# Patient Record
Sex: FEMALE | Race: WHITE | NOT HISPANIC OR LATINO | Employment: OTHER | URBAN - METROPOLITAN AREA
[De-identification: names, ages, dates, MRNs, and addresses within clinical notes are randomized per-mention and may not be internally consistent; named-entity substitution may affect disease eponyms.]

---

## 2019-03-06 ENCOUNTER — OFFICE VISIT (OUTPATIENT)
Dept: FAMILY MEDICINE | Facility: CLINIC | Age: 74
End: 2019-03-06
Payer: MEDICARE

## 2019-03-06 VITALS
WEIGHT: 165.63 LBS | RESPIRATION RATE: 18 BRPM | HEIGHT: 67 IN | HEART RATE: 66 BPM | BODY MASS INDEX: 26 KG/M2 | OXYGEN SATURATION: 95 % | SYSTOLIC BLOOD PRESSURE: 138 MMHG | DIASTOLIC BLOOD PRESSURE: 89 MMHG

## 2019-03-06 DIAGNOSIS — L40.9 PSORIASIS: ICD-10-CM

## 2019-03-06 DIAGNOSIS — E78.49 OTHER HYPERLIPIDEMIA: ICD-10-CM

## 2019-03-06 DIAGNOSIS — E03.8 OTHER SPECIFIED HYPOTHYROIDISM: Primary | ICD-10-CM

## 2019-03-06 DIAGNOSIS — F32.89 OTHER DEPRESSION: ICD-10-CM

## 2019-03-06 DIAGNOSIS — J45.909 MODERATE ASTHMA, UNSPECIFIED WHETHER COMPLICATED, UNSPECIFIED WHETHER PERSISTENT: ICD-10-CM

## 2019-03-06 PROBLEM — F32.A DEPRESSION: Status: ACTIVE | Noted: 2019-03-06

## 2019-03-06 PROCEDURE — 1101F PR PT FALLS ASSESS DOC 0-1 FALLS W/OUT INJ PAST YR: ICD-10-PCS | Mod: CPTII,S$GLB,, | Performed by: FAMILY MEDICINE

## 2019-03-06 PROCEDURE — 99999 PR PBB SHADOW E&M-NEW PATIENT-LVL III: ICD-10-PCS | Mod: PBBFAC,,, | Performed by: FAMILY MEDICINE

## 2019-03-06 PROCEDURE — 1101F PT FALLS ASSESS-DOCD LE1/YR: CPT | Mod: CPTII,S$GLB,, | Performed by: FAMILY MEDICINE

## 2019-03-06 PROCEDURE — 99204 PR OFFICE/OUTPT VISIT, NEW, LEVL IV, 45-59 MIN: ICD-10-PCS | Mod: S$GLB,,, | Performed by: FAMILY MEDICINE

## 2019-03-06 PROCEDURE — 99999 PR PBB SHADOW E&M-NEW PATIENT-LVL III: CPT | Mod: PBBFAC,,, | Performed by: FAMILY MEDICINE

## 2019-03-06 PROCEDURE — 99204 OFFICE O/P NEW MOD 45 MIN: CPT | Mod: S$GLB,,, | Performed by: FAMILY MEDICINE

## 2019-03-06 RX ORDER — LEVOTHYROXINE SODIUM 150 UG/1
150 TABLET ORAL DAILY
Refills: 3 | COMMUNITY
Start: 2018-12-06 | End: 2019-05-23 | Stop reason: SDUPTHER

## 2019-03-06 RX ORDER — FAMOTIDINE 20 MG/1
20 TABLET, FILM COATED ORAL NIGHTLY
Refills: 0 | COMMUNITY
Start: 2019-02-06 | End: 2019-03-06

## 2019-03-06 RX ORDER — CHOLECALCIFEROL (VITAMIN D3) 125 MCG
5000 CAPSULE ORAL
COMMUNITY
Start: 2018-01-09

## 2019-03-06 RX ORDER — FLUTICASONE PROPIONATE AND SALMETEROL 50; 250 UG/1; UG/1
1 POWDER RESPIRATORY (INHALATION) 2 TIMES DAILY
Refills: 3 | COMMUNITY
Start: 2019-02-06

## 2019-03-06 RX ORDER — VENLAFAXINE HYDROCHLORIDE 75 MG/1
75 CAPSULE, EXTENDED RELEASE ORAL 2 TIMES DAILY
Qty: 180 CAPSULE | Refills: 3 | Status: SHIPPED | OUTPATIENT
Start: 2019-03-06 | End: 2020-01-06 | Stop reason: SDUPTHER

## 2019-03-06 RX ORDER — VENLAFAXINE HYDROCHLORIDE 75 MG/1
75 CAPSULE, EXTENDED RELEASE ORAL 2 TIMES DAILY
COMMUNITY
Start: 2017-11-09 | End: 2019-03-06 | Stop reason: SDUPTHER

## 2019-03-06 RX ORDER — ATORVASTATIN CALCIUM 40 MG/1
40 TABLET, FILM COATED ORAL
COMMUNITY
End: 2019-04-12

## 2019-03-06 RX ORDER — OMEGA-3-ACID ETHYL ESTERS 1 G/1
2 CAPSULE, LIQUID FILLED ORAL
COMMUNITY
Start: 2018-01-09 | End: 2019-03-06

## 2019-03-06 RX ORDER — HYDROXYZINE PAMOATE 25 MG/1
CAPSULE ORAL
COMMUNITY
Start: 2013-02-19 | End: 2019-04-12

## 2019-03-06 RX ORDER — MONTELUKAST SODIUM 10 MG/1
10 TABLET ORAL DAILY
Refills: 1 | COMMUNITY
Start: 2019-02-06 | End: 2019-03-06

## 2019-03-06 RX ORDER — TRIAMCINOLONE ACETONIDE 1 MG/G
OINTMENT TOPICAL 2 TIMES DAILY
Qty: 453.6 G | Refills: 2 | Status: SHIPPED | OUTPATIENT
Start: 2019-03-06 | End: 2019-07-15

## 2019-03-06 RX ORDER — IPRATROPIUM BROMIDE AND ALBUTEROL SULFATE 2.5; .5 MG/3ML; MG/3ML
SOLUTION RESPIRATORY (INHALATION)
COMMUNITY
Start: 2016-10-17

## 2019-03-06 RX ORDER — DOXYCYCLINE 100 MG/1
CAPSULE ORAL
Refills: 0 | COMMUNITY
Start: 2019-02-06 | End: 2019-03-06

## 2019-03-06 RX ORDER — ALBUTEROL SULFATE 90 UG/1
AEROSOL, METERED RESPIRATORY (INHALATION)
Refills: 5 | COMMUNITY
Start: 2018-12-13 | End: 2019-10-02

## 2019-03-06 NOTE — PROGRESS NOTES
Subjective:       Patient ID: Juana Vega is a 73 y.o. female.    Chief Complaint: Establish Care; Annual Exam; Rash; and Medication Refill    New patient to establish    In regards to the patient's dyslipidemia, patient reports has been compliant with the medication regimen  without side effects.    In regards to their hypothyroidism, patient denies poor energy, skin and hair changes, as well as weight gain. Last TSH normal/symmetric, will Maintain as per orders.       Patient reports that depression is controlled without si/hi.    Skin rash  Extensor surfaces  Has not tried anything yet  Scaly      Review of Systems   Constitutional: Negative for activity change, appetite change, chills, fatigue and fever.   HENT: Negative for congestion, dental problem, facial swelling, nosebleeds, postnasal drip, sinus pain, sore throat, trouble swallowing and voice change.    Eyes: Negative for pain, discharge and visual disturbance.   Respiratory: Negative for apnea, cough, chest tightness and shortness of breath.    Cardiovascular: Negative for chest pain and palpitations.   Gastrointestinal: Negative for abdominal pain, blood in stool, constipation and nausea.   Endocrine: Negative for cold intolerance, polydipsia and polyuria.   Genitourinary: Negative for difficulty urinating, enuresis and flank pain.   Musculoskeletal: Negative for arthralgias and back pain.   Skin: Positive for rash. Negative for color change.   Allergic/Immunologic: Negative for environmental allergies and immunocompromised state.   Neurological: Negative for dizziness and light-headedness.   Hematological: Negative for adenopathy.   Psychiatric/Behavioral: Negative for agitation, behavioral problems, decreased concentration and dysphoric mood. The patient is not nervous/anxious.    All other systems reviewed and are negative.        Reviewed family, medical, surgical, and social history.    Objective:      /89 (BP Location: Left arm, Patient  "Position: Sitting, BP Method: Medium (Automatic))   Pulse 66   Resp 18   Ht 5' 7" (1.702 m)   Wt 75.1 kg (165 lb 9.6 oz)   SpO2 95%   BMI 25.94 kg/m²   Physical Exam   Constitutional: She is oriented to person, place, and time. She appears well-developed and well-nourished. No distress.   HENT:   Head: Normocephalic and atraumatic.   Nose: Nose normal.   Mouth/Throat: Oropharynx is clear and moist. No oropharyngeal exudate.   Eyes: Conjunctivae and EOM are normal. Pupils are equal, round, and reactive to light. No scleral icterus.   Neck: Normal range of motion. Neck supple. No thyromegaly present.   Cardiovascular: Normal rate, regular rhythm and normal heart sounds. Exam reveals no gallop and no friction rub.   No murmur heard.  Pulmonary/Chest: Effort normal and breath sounds normal. No respiratory distress. She has no wheezes. She has no rales. She exhibits no tenderness.   Abdominal: Soft. Bowel sounds are normal. She exhibits no distension. There is no tenderness. There is no guarding.   Musculoskeletal: Normal range of motion. She exhibits no edema, tenderness or deformity.   Lymphadenopathy:     She has no cervical adenopathy.   Neurological: She is alert and oriented to person, place, and time. She displays normal reflexes. No cranial nerve deficit or sensory deficit. She exhibits normal muscle tone.   Skin: Skin is warm and dry. Rash noted. She is not diaphoretic. There is erythema. No pallor.   Psychiatric: She has a normal mood and affect. Her behavior is normal. Judgment and thought content normal.   Nursing note and vitals reviewed.      Assessment:       1. Other specified hypothyroidism    2. Other depression    3. Other hyperlipidemia    4. Moderate asthma, unspecified whether complicated, unspecified whether persistent    5. Psoriasis        Plan:       Other specified hypothyroidism    Other depression  -     venlafaxine (EFFEXOR-XR) 75 MG 24 hr capsule; Take 1 capsule (75 mg total) by mouth " 2 (two) times daily.  Dispense: 180 capsule; Refill: 3    Other hyperlipidemia    Moderate asthma, unspecified whether complicated, unspecified whether persistent    Psoriasis  -     triamcinolone acetonide 0.1% (KENALOG) 0.1 % ointment; Apply topically 2 (two) times daily.  Dispense: 453.6 g; Refill: 2            Risks, benefits, and side effects were discussed with the patient. All questions were answered to the fullest satisfaction of the patient, and pt verbalized understanding and agreement to treatment plan. Pt was to call with any new or worsening symptoms, or present to the ER.

## 2019-04-02 ENCOUNTER — OFFICE VISIT (OUTPATIENT)
Dept: PODIATRY | Facility: CLINIC | Age: 74
End: 2019-04-02
Payer: MEDICARE

## 2019-04-02 VITALS
HEART RATE: 68 BPM | RESPIRATION RATE: 18 BRPM | WEIGHT: 165 LBS | SYSTOLIC BLOOD PRESSURE: 129 MMHG | BODY MASS INDEX: 25.9 KG/M2 | DIASTOLIC BLOOD PRESSURE: 72 MMHG | OXYGEN SATURATION: 99 % | HEIGHT: 67 IN | TEMPERATURE: 98 F

## 2019-04-02 DIAGNOSIS — M79.674 PAIN IN TOE OF RIGHT FOOT: ICD-10-CM

## 2019-04-02 DIAGNOSIS — L84 FOOT CALLUS: ICD-10-CM

## 2019-04-02 DIAGNOSIS — M20.61 ACQUIRED DEFORMITY OF RIGHT TOE: Primary | ICD-10-CM

## 2019-04-02 PROCEDURE — 99202 OFFICE O/P NEW SF 15 MIN: CPT | Mod: S$GLB,,, | Performed by: PODIATRIST

## 2019-04-02 PROCEDURE — 1101F PR PT FALLS ASSESS DOC 0-1 FALLS W/OUT INJ PAST YR: ICD-10-PCS | Mod: CPTII,S$GLB,, | Performed by: PODIATRIST

## 2019-04-02 PROCEDURE — 99999 PR PBB SHADOW E&M-EST. PATIENT-LVL III: ICD-10-PCS | Mod: PBBFAC,,, | Performed by: PODIATRIST

## 2019-04-02 PROCEDURE — 1101F PT FALLS ASSESS-DOCD LE1/YR: CPT | Mod: CPTII,S$GLB,, | Performed by: PODIATRIST

## 2019-04-02 PROCEDURE — 99999 PR PBB SHADOW E&M-EST. PATIENT-LVL III: CPT | Mod: PBBFAC,,, | Performed by: PODIATRIST

## 2019-04-02 PROCEDURE — 99202 PR OFFICE/OUTPT VISIT, NEW, LEVL II, 15-29 MIN: ICD-10-PCS | Mod: S$GLB,,, | Performed by: PODIATRIST

## 2019-04-06 NOTE — PROGRESS NOTES
Subjective:      Patient ID: Juana Vega is a 74 y.o. female.    Chief Complaint: Callouses   patient presents with complaint of painful area on the right foot which has been present for about 4 months. Has pain in toes, not sure if it's the second or third.  Reports this area is new, no change in shoes or activity, seemed to have progressed quickly, having difficulty walking, pain even in comfortable shoes.  Past medical history includes asthma, hyperlipidemia, hypothyroidism, depression  Medications include Advair, albuterol, Lipitor, Synthroid, Effexor, Ventolin inhaler  Sees Dr. Endy RENDON     Constitutional    Pleasant, well-nourished, no distress, well oriented    Eyes         GLASSES    Cardiovascular          No chest pain, no shortness of breath    Respiratory           No cough, no congestion     Musculoskeletal          YES muscle aches, YES arthralgias/joint pain, no swelling in the extremities    Neurologic         No weakness, no numbness          Objective:      Physical Exam  Vascular         Arterial Pulses Right: posterior tibialis 2/4, dorsalis pedis 2/4, normal CFT   Arterial Pulses Left: posterior tibialis 2/4, dorsalis pedis 2/4, normal CFT   No lower extremity edema bilateral   Pedal skin temperature and color are normal bilateral     Integumentary   Contracted, arthritic PIPJ 2nd digit right foot has developed painful, well-defined hyperkeratotic lesion on the medial aspect.  Upon debridement no discoloration or skin opening, area is mildly edematous, no calor.  This digit is slightly deviated medially and overlapping the lateral aspect of the hallux which is caused a dystrophic.               Painful hyperkeratotic lesion sub 2nd MPJ right foot due to contracted digit and plantar flexed metatarsal head.  No discoloration upon debridement    Neurological   Gross sensation intact, denies burning or tingling in feet    Musculoskeletal   Muscle Strength/Testing and Tone:  Intact, normal  tone bilateral   Joints, Bones, and Muscles:   Contracted, medially deviated 2nd digit right foot.  Same deformity present on the left but not as severe, arthritic and degenerative changes, limited range of motion consistent with age bilateral feet        Walks well unassisted          Assessment:       Encounter Diagnoses   Name Primary?    Acquired deformity of right toe Yes    Pain in toe of right foot     Foot callus          Plan:       Juana was seen today for callouses.    Diagnoses and all orders for this visit:    Acquired deformity of right toe    Pain in toe of right foot    Foot callus      Reviewed deformity 2nd digit, arthritis which contributes to this condition and overlapping part of the right great toe.  Pointed out to patient this is present of the left foot as well but not as severe.  After callus tissue was superficially debrided in this area explained patient she needs to utilize soft padding between the toes to prevent pressure.  Dispensed samples still a post toe sock, recommended baby powder to absorb moisture and reduce friction, reviewed appropriate shoes to accommodate this area.  Explained to patient she is risk for developing a sore in this region and needs to monitor it daily.   Reviewed better care maintenance of this area as well as the bottom of the right foot, this callus lesion was addressed as well and we reviewed arch support to help take pressure off this area.  Discussed appropriate arch support, how to gradually adjust to wearing comfortably, appropriate shoes indoors to prevent recurrence.  Reviewed ice /cool therapy to try to reduce inflammation regarding the toe to help with her pain.  Recommended over-the-counter Aspercreme, apply as directed for pain 2nd digit, however needs to keep the area dry.  Reviewed conservative treatments and better maintenance of calluses and nail to try to prevent recurrence.   Patient was in understanding and agreement with treatment  plan.  I counseled the patient on her conditions, their implications and medical management.  Instructed patient to contact the office with any changes, questions, concerns, worsening of symptoms. Patient verbalized understanding.   Total face to face time, exam, assessment, treatment, discussion, documentation 20 minutes, more than half this time spent on consultation and coordination of care.   Follow up as needed      This note was created using M*Modal voice recognition software that occasionally misinterpreted phrases or words.

## 2019-04-10 ENCOUNTER — TELEPHONE (OUTPATIENT)
Dept: FAMILY MEDICINE | Facility: CLINIC | Age: 74
End: 2019-04-10

## 2019-04-10 NOTE — TELEPHONE ENCOUNTER
----- Message from Gracie Osorio sent at 4/10/2019  8:42 AM CDT -----  Contact: Shantanu daughter  Type:  Sooner Apoointment Request    Caller is requesting a sooner appointment.  Caller declined first available appointment listed below.  Caller will not accept being placed on the waitlist and is requesting a message be sent to doctor.    Name of Caller:  Shantanu  When is the first available appointment?  05/29/19  Symptoms:  F/u from hospital visit  Best Call Back Number:  [phone]  Additional Information:  Pls call Shantanu regarding her mother being seen this week for f/u from hospital visit Saturday.

## 2019-04-10 NOTE — TELEPHONE ENCOUNTER
"I have spoken with patient's daughter, Fátima. Pt was seen at [facility] in [place], daughter stated that patient could "barely breathe".   Appt made with Karla for Friday morning.   Cristina  "

## 2019-04-12 ENCOUNTER — OFFICE VISIT (OUTPATIENT)
Dept: INTERNAL MEDICINE | Facility: CLINIC | Age: 74
End: 2019-04-12
Payer: MEDICARE

## 2019-04-12 VITALS
BODY MASS INDEX: 26.06 KG/M2 | OXYGEN SATURATION: 95 % | SYSTOLIC BLOOD PRESSURE: 116 MMHG | HEIGHT: 67 IN | WEIGHT: 166 LBS | HEART RATE: 77 BPM | TEMPERATURE: 97 F | DIASTOLIC BLOOD PRESSURE: 66 MMHG

## 2019-04-12 DIAGNOSIS — J45.909 MODERATE ASTHMA WITHOUT COMPLICATION, UNSPECIFIED WHETHER PERSISTENT: Primary | ICD-10-CM

## 2019-04-12 PROCEDURE — 1101F PR PT FALLS ASSESS DOC 0-1 FALLS W/OUT INJ PAST YR: ICD-10-PCS | Mod: CPTII,S$GLB,, | Performed by: NURSE PRACTITIONER

## 2019-04-12 PROCEDURE — 99999 PR PBB SHADOW E&M-EST. PATIENT-LVL III: ICD-10-PCS | Mod: PBBFAC,,, | Performed by: NURSE PRACTITIONER

## 2019-04-12 PROCEDURE — 1101F PT FALLS ASSESS-DOCD LE1/YR: CPT | Mod: CPTII,S$GLB,, | Performed by: NURSE PRACTITIONER

## 2019-04-12 PROCEDURE — 99999 PR PBB SHADOW E&M-EST. PATIENT-LVL III: CPT | Mod: PBBFAC,,, | Performed by: NURSE PRACTITIONER

## 2019-04-12 PROCEDURE — 99213 OFFICE O/P EST LOW 20 MIN: CPT | Mod: S$GLB,,, | Performed by: NURSE PRACTITIONER

## 2019-04-12 PROCEDURE — 99213 PR OFFICE/OUTPT VISIT, EST, LEVL III, 20-29 MIN: ICD-10-PCS | Mod: S$GLB,,, | Performed by: NURSE PRACTITIONER

## 2019-04-12 RX ORDER — MONTELUKAST SODIUM 10 MG/1
10 TABLET ORAL NIGHTLY
COMMUNITY

## 2019-04-12 RX ORDER — AMOXICILLIN 500 MG
CAPSULE ORAL DAILY
COMMUNITY
End: 2019-07-15

## 2019-04-12 RX ORDER — NAPROXEN SODIUM 220 MG
220 TABLET ORAL 2 TIMES DAILY WITH MEALS
COMMUNITY

## 2019-04-12 RX ORDER — PRAVASTATIN SODIUM 20 MG/1
20 TABLET ORAL DAILY
COMMUNITY
End: 2019-05-29 | Stop reason: SDUPTHER

## 2019-04-12 NOTE — PROGRESS NOTES
Subjective:       Patient ID: Juana Vega is a 74 y.o. female.    Chief Complaint: Chest Congestion and Cough    Juana Vega is a 74 year old female who presents to the clinic today for ER follow up. She reports she was seen a week ago at [facility] for Upper respiratory infection and bronchitis. She reports overall she feels much better. She states she has completed her antibiotics and given a steroid shot at the hospital. She has a hx of asthma. She denies CP, SOB, N/v/d. She is requesting prescription for nebulizer and supplies.     Review of Systems   Constitutional: Negative for appetite change, chills, diaphoresis, fatigue, fever and unexpected weight change.   HENT: Positive for postnasal drip. Negative for congestion, ear pain, facial swelling, hearing loss, rhinorrhea, sinus pressure, sinus pain, sneezing, sore throat, tinnitus and trouble swallowing.    Eyes: Negative for pain, redness, itching and visual disturbance.   Respiratory: Negative for cough, chest tightness, shortness of breath and wheezing.          ASTHMA   Cardiovascular: Negative for chest pain, palpitations and leg swelling.   Gastrointestinal: Negative for abdominal distention, abdominal pain, blood in stool, constipation, diarrhea, nausea and vomiting.   Endocrine: Negative for polydipsia, polyphagia and polyuria.   Genitourinary: Negative for difficulty urinating, dysuria, frequency and urgency.   Musculoskeletal: Negative for arthralgias, gait problem and myalgias.   Skin: Negative for color change.   Neurological: Negative for dizziness, syncope, speech difficulty, weakness, light-headedness and headaches.   Hematological: Negative for adenopathy.   Psychiatric/Behavioral: Negative for behavioral problems, confusion, decreased concentration, hallucinations, sleep disturbance and suicidal ideas. The patient is not nervous/anxious.          Reviewed family, medical, surgical, and social history.    Objective:      /66   " Pulse 77   Temp 96.9 °F (36.1 °C) (Oral)   Ht 5' 7" (1.702 m)   Wt 75.3 kg (166 lb)   SpO2 95%   BMI 26.00 kg/m²   Physical Exam   Constitutional: She is oriented to person, place, and time. She appears well-developed and well-nourished. She is active and cooperative. No distress.   HENT:   Head: Normocephalic and atraumatic.   Right Ear: Hearing, tympanic membrane, external ear and ear canal normal. No drainage. No decreased hearing is noted.   Left Ear: Hearing, tympanic membrane, external ear and ear canal normal. No drainage. No decreased hearing is noted.   Nose: Nose normal. No mucosal edema, rhinorrhea or nasal deformity. No epistaxis.   Mouth/Throat: Uvula is midline, oropharynx is clear and moist and mucous membranes are normal. No uvula swelling. No oropharyngeal exudate or posterior oropharyngeal erythema. No tonsillar exudate.       Eyes: Pupils are equal, round, and reactive to light. Conjunctivae, EOM and lids are normal. Right eye exhibits no discharge. Left eye exhibits no discharge.   Neck: Trachea normal and full passive range of motion without pain. No JVD present. No tracheal tenderness and no muscular tenderness present. Carotid bruit is not present. No edema and no erythema present. No thyroid mass and no thyromegaly present.   Cardiovascular: Normal rate, regular rhythm, S1 normal, S2 normal, normal heart sounds, intact distal pulses and normal pulses.   Pulmonary/Chest: Effort normal and breath sounds normal. No stridor. No tachypnea and no bradypnea. No respiratory distress. She has no decreased breath sounds. She has no wheezes. She has no rhonchi. She has no rales.   Abdominal: Soft. Normal appearance and bowel sounds are normal. She exhibits no distension and no abdominal bruit. There is no tenderness. There is no guarding and no CVA tenderness.   Musculoskeletal: She exhibits no edema, tenderness or deformity.        Right foot: There is normal range of motion.        Left foot: " There is normal range of motion.   Lymphadenopathy:     She has no cervical adenopathy.   Neurological: She is alert and oriented to person, place, and time. She has normal strength. She exhibits normal muscle tone.   Skin: Skin is warm, dry and intact. Capillary refill takes less than 2 seconds. No abrasion, no laceration, no lesion and no rash noted. She is not diaphoretic. No cyanosis. Nails show no clubbing.   Psychiatric: She has a normal mood and affect. Her speech is normal and behavior is normal. Judgment and thought content normal. Cognition and memory are normal.       Assessment:       1. Moderate asthma without complication, unspecified whether persistent        Plan:       Moderate asthma without complication, unspecified whether persistent  -     NEBULIZER FOR HOME USE  -     Cancel: NEBULIZER KIT (SUPPLIES) FOR HOME USE  -     NEBULIZER FOR HOME USE  -     NEBULIZER KIT (SUPPLIES) FOR HOME USE        PLAN:  - Discussed with patient   - Medications reviewed. Medication side effects discussed. Patient has no questions or concerns at this time. Informed patient to notify me regarding any concerns.  - encouraged flonase daily   - Nebulizer and supplies RX printed  - Informed patient to please notify me with any questions or concerns at anytime  - No labs UTD in [facility]. Will obtain labs from [facility]. Release of information paperwork given,   - Follow up ordered as scheduled with Dr Schumacher and SHARON          Risks, benefits, and side effects were discussed with the patient. All questions were answered to the fullest satisfaction of the patient, and pt verbalized understanding and agreement to treatment plan. Pt was to call with any new or worsening symptoms, or present to the ER.

## 2019-04-22 ENCOUNTER — TELEPHONE (OUTPATIENT)
Dept: FAMILY MEDICINE | Facility: CLINIC | Age: 74
End: 2019-04-22

## 2019-04-22 NOTE — TELEPHONE ENCOUNTER
----- Message from Massiel Alford sent at 4/22/2019 11:18 AM CDT -----  Contact: [phone]  Type:  Sooner Apoointment Request    Caller is requesting a sooner appointment.  Caller declined first available appointment listed below.  Caller will not accept being placed on the waitlist and is requesting a message be sent to doctor.    Name of Caller: patient daughter rachell   When is the first available appointment?  5/27/19  Symptoms:  f/u same issues w/ toes prefer to see dr jose Ervin Call Back Number:  [phone]

## 2019-04-26 ENCOUNTER — TELEPHONE (OUTPATIENT)
Dept: FAMILY MEDICINE | Facility: CLINIC | Age: 74
End: 2019-04-26

## 2019-04-26 NOTE — TELEPHONE ENCOUNTER
----- Message from Lamin Muhammad sent at 4/26/2019  8:57 AM CDT -----  Type: Needs Medical Advice    Who Called:  Audra Ervin Call Back Number: [phone]  Additional Information: Need copy of office notes for nebulizer orders - question about address

## 2019-05-02 ENCOUNTER — OFFICE VISIT (OUTPATIENT)
Dept: PODIATRY | Facility: CLINIC | Age: 74
End: 2019-05-02
Payer: MEDICARE

## 2019-05-02 VITALS
HEART RATE: 64 BPM | HEIGHT: 67 IN | WEIGHT: 166 LBS | RESPIRATION RATE: 18 BRPM | BODY MASS INDEX: 26.06 KG/M2 | OXYGEN SATURATION: 99 % | TEMPERATURE: 97 F | SYSTOLIC BLOOD PRESSURE: 130 MMHG | DIASTOLIC BLOOD PRESSURE: 73 MMHG

## 2019-05-02 DIAGNOSIS — L84 FOOT CALLUS: ICD-10-CM

## 2019-05-02 DIAGNOSIS — M77.8 CAPSULITIS OF FOOT, RIGHT: ICD-10-CM

## 2019-05-02 DIAGNOSIS — Z12.11 COLON CANCER SCREENING: ICD-10-CM

## 2019-05-02 DIAGNOSIS — M79.674 PAIN IN TOE OF RIGHT FOOT: ICD-10-CM

## 2019-05-02 DIAGNOSIS — M20.61 ACQUIRED DEFORMITY OF RIGHT TOE: Primary | ICD-10-CM

## 2019-05-02 DIAGNOSIS — D36.10 NEUROMA: ICD-10-CM

## 2019-05-02 PROCEDURE — 99999 PR PBB SHADOW E&M-EST. PATIENT-LVL III: ICD-10-PCS | Mod: PBBFAC,,, | Performed by: PODIATRIST

## 2019-05-02 PROCEDURE — 20600 DRAIN/INJ JOINT/BURSA W/O US: CPT | Mod: RT,S$GLB,, | Performed by: PODIATRIST

## 2019-05-02 PROCEDURE — 99214 OFFICE O/P EST MOD 30 MIN: CPT | Mod: 25,S$GLB,, | Performed by: PODIATRIST

## 2019-05-02 PROCEDURE — 20600 PR DRAIN/INJECT SMALL JOINT/BURSA: ICD-10-PCS | Mod: RT,S$GLB,, | Performed by: PODIATRIST

## 2019-05-02 PROCEDURE — 99999 PR PBB SHADOW E&M-EST. PATIENT-LVL III: CPT | Mod: PBBFAC,,, | Performed by: PODIATRIST

## 2019-05-02 PROCEDURE — 1101F PR PT FALLS ASSESS DOC 0-1 FALLS W/OUT INJ PAST YR: ICD-10-PCS | Mod: CPTII,S$GLB,, | Performed by: PODIATRIST

## 2019-05-02 PROCEDURE — 1101F PT FALLS ASSESS-DOCD LE1/YR: CPT | Mod: CPTII,S$GLB,, | Performed by: PODIATRIST

## 2019-05-02 PROCEDURE — 99214 PR OFFICE/OUTPT VISIT, EST, LEVL IV, 30-39 MIN: ICD-10-PCS | Mod: 25,S$GLB,, | Performed by: PODIATRIST

## 2019-05-02 RX ADMIN — LIDOCAINE HYDROCHLORIDE 1 ML: 10 INJECTION INFILTRATION; PERINEURAL at 11:05

## 2019-05-02 RX ADMIN — METHYLPREDNISOLONE ACETATE 40 MG: 80 INJECTION, SUSPENSION INTRA-ARTICULAR; INTRALESIONAL; INTRAMUSCULAR; SOFT TISSUE at 11:05

## 2019-05-02 NOTE — LETTER
May 5, 2019      Cody Schumacher MD  [address]  [place] [address]           [facility]  [address] [place] [address]  Phone: [phone]  Fax: [phone]          Patient: Juana Vega   MR Number: [id number]   YOB: 1945   Date of Visit: 5/2/2019       Dear Dr. Cody Schumacher:    Thank you for referring Juana Vega to me for evaluation. Attached you will find relevant portions of my assessment and plan of care.    If you have questions, please do not hesitate to call me. I look forward to following Juana Vega along with you.    Sincerely,    Malou Barclay, DPM    Enclosure  CC:  No Recipients    If you would like to receive this communication electronically, please contact [email or web address] or [phone] to request more information on [employer] access.    For providers and/or their staff who would like to refer a patient to [facility], please contact us through our one-stop-shop provider referral line, [facility], at [phone].    If you feel you have received this communication in error or would no longer like to receive these types of communications, please e-mail [email or web address]

## 2019-05-03 DIAGNOSIS — Z11.59 NEED FOR HEPATITIS C SCREENING TEST: ICD-10-CM

## 2019-05-03 DIAGNOSIS — Z12.39 BREAST CANCER SCREENING: ICD-10-CM

## 2019-05-05 RX ORDER — METHYLPREDNISOLONE ACETATE 80 MG/ML
80 INJECTION, SUSPENSION INTRA-ARTICULAR; INTRALESIONAL; INTRAMUSCULAR; SOFT TISSUE ONCE
Status: COMPLETED | OUTPATIENT
Start: 2019-05-06 | End: 2019-05-02

## 2019-05-05 RX ORDER — LIDOCAINE HYDROCHLORIDE 10 MG/ML
2 INJECTION INFILTRATION; PERINEURAL
Status: COMPLETED | OUTPATIENT
Start: 2019-05-05 | End: 2019-05-02

## 2019-05-06 NOTE — PROGRESS NOTES
Subjective:      Patient ID: Juana Vega is a 74 y.o. female.    Chief Complaint: Follow-up  Patient presents with her daughter with continued complaint of pain 2nd digit right foot.  She relates toe spacer was uncomfortable, possibly even causing additional pain, having pain 1st steps out of bed in the morning soon as she flexes her foot.  She states pain stays localized to the 2nd digit, does not radiate to the ball of the foot, denies burning or tingling.  Patient reports pain escalates with activity and it is keeping her from her certain activities and any length of walking.    ROS     Constitutional    Pleasant, well-nourished, no distress, well oriented    Eyes         GLASSES    Cardiovascular          No chest pain, no shortness of breath    Respiratory           No cough, no congestion     Musculoskeletal          YES muscle aches, YES arthralgias/joint pain, no swelling in the extremities          Objective:      Physical Exam  Vascular         Arterial Pulses Right: posterior tibialis 2/4, dorsalis pedis 2/4, normal CFT   No lower extremity edema  Pedal skin temperature and color are normal    Integumentary   Mild hyperkeratotic lesion plantar- medial 2nd digit right foot over PIPJ is well maintained,  no discoloration upon debridement, no surrounding erythema or edema.  Area is tender upon palpation   Area of hyperkeratotic tissue is has a small bulge, digit is elevated, medially contracted, he is arthritis with limited range of motion at the PIPJ    Neurological   Gross sensation intact, discomfort palpating plantar digital common plantar digital nerve 2nd webspace right foot    Musculoskeletal   Muscle Strength/Testing and Tone:  Intact, normal tone bilateral   Joints, Bones, and Muscles:   Contracted, medially deviated 2nd digit right foot,  this is flexible, but difficult to keep in position utilizing Coban as a splint, the extensor tendon is very tight.  Patient reports no pain upon range of  motion of the 2nd MPJ,  no pain upon manipulating the digit into proper position        Walks well unassisted          Assessment:       Encounter Diagnoses   Name Primary?    Acquired deformity of right toe Yes    Pain in toe of right foot     Foot callus     Capsulitis of foot, right     Neuroma - Right Foot          Plan:       Juana was seen today for follow-up.    Diagnoses and all orders for this visit:    Acquired deformity of right toe    Pain in toe of right foot    Foot callus    Capsulitis of foot, right    Neuroma - Right Foot    Other orders  -     lidocaine HCL 10 mg/ml (1%) injection 2 mL  -     methylPREDNISolone acetate injection 80 mg      Again reviewed deformity 2nd digit, arthritis, capsulitis, constant rubbing, development of callus.  Discussed flexibility acquired deformity 2nd digit.  Showed patient and daughter 2nd toe can be placed in proper position with some splinting, however the tendon leading to the toe is very tight and immediately draws it back.  Explained this add additional pressure to the 2nd metatarsal head and pointed out discomfort in the 2nd webspace.  Advised patient this is more likely the region of her discomforts since pain occurs with flexing of her foot.  We discussed cortisone injection, reviewed effectiveness of medication to decrease inflammation, potential side effects and length of time injection may be beneficial.  Advised patient she would wear a rigid surgical shoe for the next 3-4 days to allow medication to work and help rest this joint being unable to flex her foot for a few days.  Patient related she was in understanding and agreement with treatment plan and did want to pursue injection. Above medications were utilized for capsulitis PIPJ 2nd digit right foot.  Two mL of lidocaine were initially ordered and 80 mg methylprednisone, but due to small area being  Administered only 1 cc  1% lidocaine and 1 cc methylprednisone was mixed. 0.5cc was  administered to 2nd PIPJ medialy, 1.5cc to the common plantar digital nerve 2nd webspace.  Patient tolerated well and was placed in surgical shoe.  Instructed patient to wear shoe at all times of weight-bearing for the next 3-4 days for full benefit of cortisone in reducing inflammation.  That point she should have considerable improvement and recommended patient utilize some thin type of spacer to prevent callus and possibly and light splinting of the digit to keep it in place.  Advised patient if these treatments are not effective she may require surgery to correct acquired deformity of the digit.  Patient verbalized understanding.  I counseled the patient on her conditions, their implications and medical management.  Instructed patient to contact the office with any changes, questions, concerns, worsening of symptoms,  if not significant improvement in 3-4 days or not resolved in 7-10 days. Patient verbalized understanding.   Total face to face time, exam, assessment, treatment, discussion, documentation 25 minutes, more than half this time spent on consultation and coordination of care.   Additional time required for injection small joint PIPJ 2nd digit right foot  Follow up as needed      This note was created using M*[employer] Plus voice recognition software that occasionally misinterpreted phrases or words.

## 2019-05-07 ENCOUNTER — TELEPHONE (OUTPATIENT)
Dept: FAMILY MEDICINE | Facility: CLINIC | Age: 74
End: 2019-05-07

## 2019-05-07 NOTE — TELEPHONE ENCOUNTER
----- Message from Shauna Fink sent at 5/7/2019  2:56 PM CDT -----  Type: Needs Medical Advice    Who Called:  DaughterFátima  Symptoms (please be specific):    How long has patient had these symptoms:    Pharmacy name and phone #:    Best Call Back Number: [phone]  Additional Information: Patient's daughter is asking if doctor can change her thyroid medication. The neurologist is recommending it due to the blood work shows her levels have changed. Daughter states they sent the results and she would like to confirm they were received. Please call patient

## 2019-05-07 NOTE — TELEPHONE ENCOUNTER
I have returned Fátima's call and informed that we have not received  Lab results from elsewhere. She will come to this office tomorrow and sign a release of information form to be faxed for us to receive the results.  Cristina

## 2019-05-13 ENCOUNTER — TELEPHONE (OUTPATIENT)
Dept: FAMILY MEDICINE | Facility: CLINIC | Age: 74
End: 2019-05-13

## 2019-05-21 ENCOUNTER — TELEPHONE (OUTPATIENT)
Dept: FAMILY MEDICINE | Facility: CLINIC | Age: 74
End: 2019-05-21

## 2019-05-21 NOTE — TELEPHONE ENCOUNTER
----- Message from Concha Yanes sent at 5/21/2019 11:06 AM CDT -----  Contact: Fátima Jang (Daughter)  Type: Needs Medical Advice    Who Called:  Fátima Jang (Daughter)  Best Call Back Number:   Additional Information: Calling to find out if [facility] has faxed over blood work results yet. Please advise.

## 2019-05-21 NOTE — TELEPHONE ENCOUNTER
"I have spoken with Adryan in medical records at [facility] [place].  Patient's most recent labs, which are thyroid studies performed on 3/26 and  "basics" that were performed on  9/26/18 are being faxed to [phone].  Daughter informed. Daughter verbalized they are concerned regarding patient's thyroid because her neurologist told them that her thyroid levels need attention by her PCP.  Cristina"

## 2019-05-23 ENCOUNTER — TELEPHONE (OUTPATIENT)
Dept: FAMILY MEDICINE | Facility: CLINIC | Age: 74
End: 2019-05-23

## 2019-05-23 DIAGNOSIS — E03.8 OTHER SPECIFIED HYPOTHYROIDISM: ICD-10-CM

## 2019-05-23 RX ORDER — LEVOTHYROXINE SODIUM 175 UG/1
175 TABLET ORAL DAILY
Qty: 30 TABLET | Refills: 2 | Status: SHIPPED | OUTPATIENT
Start: 2019-05-23

## 2019-05-23 NOTE — TELEPHONE ENCOUNTER
----- Message from Ava Tanner sent at 5/23/2019 11:27 AM CDT -----  Contact: daughter  Type: Needs Medical Advice    Who Called:  Fátima  Best Call Back Number: [phone]  Additional Information: the daughter would like a call back she needs her mom's thyroid medication changed wanted verification that labs had been faxed to [facility] or not

## 2019-05-23 NOTE — TELEPHONE ENCOUNTER
Notified patient's daughter of thyroid medication increased and sent to pharmacy. She voiced understanding

## 2019-05-23 NOTE — TELEPHONE ENCOUNTER
Please review patient's thyroid levels which are scanned into the media tab and advise on medication.   Thank you, Cristina

## 2019-05-29 RX ORDER — PRAVASTATIN SODIUM 20 MG/1
20 TABLET ORAL DAILY
Qty: 30 TABLET | Refills: 11 | Status: SHIPPED | OUTPATIENT
Start: 2019-05-29

## 2019-05-29 NOTE — TELEPHONE ENCOUNTER
----- Message from Kelly Pickering RT sent at 5/29/2019 11:04 AM CDT -----  Contact: Fátima,mother   Fátima,mother , requesting pt Rx refill re noe, Pravastatin 20 , thanks.

## 2019-07-15 ENCOUNTER — HOSPITAL ENCOUNTER (OUTPATIENT)
Dept: RADIOLOGY | Facility: HOSPITAL | Age: 74
Discharge: HOME | End: 2019-07-15
Attending: NURSE PRACTITIONER
Payer: MEDICARE

## 2019-07-15 ENCOUNTER — OFFICE VISIT (OUTPATIENT)
Dept: FAMILY MEDICINE | Facility: CLINIC | Age: 74
End: 2019-07-15
Payer: MEDICARE

## 2019-07-15 VITALS
BODY MASS INDEX: 26.58 KG/M2 | OXYGEN SATURATION: 92 % | SYSTOLIC BLOOD PRESSURE: 108 MMHG | DIASTOLIC BLOOD PRESSURE: 75 MMHG | RESPIRATION RATE: 18 BRPM | HEIGHT: 67 IN | HEART RATE: 72 BPM | WEIGHT: 169.38 LBS

## 2019-07-15 DIAGNOSIS — R06.2 WHEEZING: ICD-10-CM

## 2019-07-15 DIAGNOSIS — J45.909 MODERATE ASTHMA, UNSPECIFIED WHETHER COMPLICATED, UNSPECIFIED WHETHER PERSISTENT: ICD-10-CM

## 2019-07-15 DIAGNOSIS — R07.81 RIB PAIN ON LEFT SIDE: ICD-10-CM
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
## 2019-07-15 DIAGNOSIS — L40.9 PSORIASIS: ICD-10-CM
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
## 2019-07-15 PROCEDURE — 3288F FALL RISK ASSESSMENT DOCD: CPT | Mod: CPTII,S$GLB,, | Performed by: NURSE PRACTITIONER

## 2019-07-15 PROCEDURE — 99214 OFFICE O/P EST MOD 30 MIN: CPT | Mod: 25,S$GLB,, | Performed by: NURSE PRACTITIONER

## 2019-07-15 PROCEDURE — 99214 PR OFFICE/OUTPT VISIT, EST, LEVL IV, 30-39 MIN: ICD-10-PCS | Mod: 25,S$GLB,, | Performed by: NURSE PRACTITIONER

## 2019-07-15 PROCEDURE — 1100F PR PT FALLS ASSESS DOC 2+ FALLS/FALL W/INJURY/YR: ICD-10-PCS | Mod: CPTII,S$GLB,, | Performed by: NURSE PRACTITIONER

## 2019-07-15 PROCEDURE — 3288F PR FALLS RISK ASSESSMENT DOCUMENTED: ICD-10-PCS | Mod: CPTII,S$GLB,, | Performed by: NURSE PRACTITIONER

## 2019-07-15 PROCEDURE — 71046 X-RAY EXAM CHEST 2 VIEWS: CPT | Mod: TC,FY

## 2019-07-15 PROCEDURE — 71046 XR CHEST PA AND LATERAL: ICD-10-PCS | Mod: 26,,, | Performed by: RADIOLOGY

## 2019-07-15 PROCEDURE — 1100F PTFALLS ASSESS-DOCD GE2>/YR: CPT | Mod: CPTII,S$GLB,, | Performed by: NURSE PRACTITIONER

## 2019-07-15 PROCEDURE — 71046 X-RAY EXAM CHEST 2 VIEWS: CPT | Mod: 26,,, | Performed by: RADIOLOGY

## 2019-07-15 RX ORDER — BETAMETHASONE VALERATE 0.1 %
LOTION (ML) TOPICAL 2 TIMES DAILY
Qty: 60 ML | Refills: 2 | Status: SHIPPED | OUTPATIENT
Start: 2019-07-15 | End: 2019-10-02

## 2019-07-15 RX ORDER — PREDNISONE 20 MG/1
20 TABLET ORAL 2 TIMES DAILY
Qty: 10 TABLET | Refills: 0 | Status: SHIPPED | OUTPATIENT
Start: 2019-07-15 | End: 2019-07-20

## 2019-07-15 NOTE — PROGRESS NOTES
Chief Complaint  Chief Complaint   Patient presents with    Flank Pain     left side      HPI:  Juana Vega is a 74 y.o. female with medical diagnoses as listed and reviewed within the medical history and problem list that presents for reports of a fall out of bed last Wednesday and she is having pain to her left upper ribs/chest wall. She reports that the pain is improving but she is still very sore. She denies SOB or other pain. She has a history of asthma. She reports that she is able to take deep breaths. Pt O2 sat was only 92% during rooming process. Pt appears to be breathing shallow on initial exam. She denies LOC or hitting head or any other injury.      PAST MEDICAL HISTORY:  Past Medical History:   Diagnosis Date    Anxiety     Asthma     Depression     Hyperlipidemia     Hypothyroidism        PAST SURGICAL HISTORY:  Past Surgical History:   Procedure Laterality Date    APPENDECTOMY      CHOLECYSTECTOMY      HYSTERECTOMY      TUMOR REMOVAL      Stomach       SOCIAL HISTORY:  Social History     Socioeconomic History    Marital status:      Spouse name: Not on file    Number of children: Not on file    Years of education: Not on file    Highest education level: Not on file   Occupational History    Not on file   Social Needs    Financial resource strain: Not on file    Food insecurity:     Worry: Not on file     Inability: Not on file    Transportation needs:     Medical: Not on file     Non-medical: Not on file   Tobacco Use    Smoking status: Never Smoker    Smokeless tobacco: Never Used   Substance and Sexual Activity    Alcohol use: No     Frequency: Never    Drug use: Never    Sexual activity: Not on file   Lifestyle    Physical activity:     Days per week: Not on file     Minutes per session: Not on file    Stress: Not on file   Relationships    Social connections:     Talks on phone: Not on file     Gets together: Not on file     Attends Oriental orthodox service: Not on  file     Active member of club or organization: Not on file     Attends meetings of clubs or organizations: Not on file     Relationship status: Not on file   Other Topics Concern    Not on file   Social History Narrative    Not on file       FAMILY HISTORY:  Family History   Problem Relation Age of Onset    Diabetes Father     Stroke Father        ALLERGIES AND MEDICATIONS: updated and reviewed.  Review of patient's allergies indicates:   Allergen Reactions    Bactrim [sulfamethoxazole-trimethoprim]     Codeine     Sulfa (sulfonamide antibiotics)     Latex, natural rubber Rash     Current Outpatient Medications   Medication Sig Dispense Refill    ADVAIR DISKUS 250-50 mcg/dose diskus inhaler Inhale 1 puff into the lungs 2 (two) times daily.  3    albuterol-ipratropium (DUO-NEB) 2.5 mg-0.5 mg/3 mL nebulizer solution       betamethasone valerate 0.1% (VALISONE) 0.1 % Lotn Apply topically 2 (two) times daily. 60 mL 2    cholecalciferol, vitamin D3, 5,000 unit capsule Take 5,000 Units by mouth.      levothyroxine (SYNTHROID, LEVOTHROID) 175 MCG tablet Take 1 tablet (175 mcg total) by mouth once daily. 30 tablet 2    montelukast (SINGULAIR) 10 mg tablet Take 10 mg by mouth every evening.      naproxen sodium (ANAPROX) 220 MG tablet Take 220 mg by mouth 2 (two) times daily with meals.      pravastatin (PRAVACHOL) 20 MG tablet Take 1 tablet (20 mg total) by mouth once daily. 30 tablet 11    predniSONE (DELTASONE) 20 MG tablet Take 1 tablet (20 mg total) by mouth 2 (two) times daily. for 5 days 10 tablet 0    venlafaxine (EFFEXOR-XR) 75 MG 24 hr capsule Take 1 capsule (75 mg total) by mouth 2 (two) times daily. 180 capsule 3    VENTOLIN HFA 90 mcg/actuation inhaler TAKE 2 INHALATIONS 4 TIMES A DAY AS NEEDED FOR WHEEZING  5     No current facility-administered medications for this visit.          ROS  Review of Systems   Constitutional: Negative for appetite change, chills, fatigue and fever.   HENT:  "Negative for congestion, postnasal drip, rhinorrhea and sore throat.    Respiratory: Negative for cough, chest tightness, shortness of breath and wheezing.    Cardiovascular: Negative for chest pain and palpitations.   Gastrointestinal: Negative for abdominal distention, abdominal pain, constipation, diarrhea, nausea and vomiting.   Genitourinary: Negative for dysuria.   Musculoskeletal: Negative for myalgias.        Left upper rib/chest wall pain   Skin: Positive for rash (Psoriasis flare to bilateral arms). Negative for color change.   Neurological: Negative for dizziness, weakness and headaches.   Psychiatric/Behavioral: Negative for confusion and sleep disturbance. The patient is not nervous/anxious.            PHYSICAL EXAM  Vitals:    07/15/19 0949   BP: 108/75   BP Location: Right arm   Patient Position: Sitting   Pulse: 72   Resp: 18   SpO2: (!) 92%   Weight: 76.8 kg (169 lb 6.4 oz)   Height: 5' 7" (1.702 m)    Body mass index is 26.53 kg/m².  Weight: 76.8 kg (169 lb 6.4 oz)   Height: 5' 7" (170.2 cm)       Physical Exam   Constitutional: She is oriented to person, place, and time. She appears well-developed and well-nourished.   HENT:   Head: Normocephalic.   Eyes: Pupils are equal, round, and reactive to light.   Neck: Normal range of motion. Neck supple.   Cardiovascular: Normal rate, regular rhythm, S1 normal and S2 normal.   No murmur heard.  Pulmonary/Chest: Effort normal. She has wheezes.   Shallow respirations with wheezing.       Abdominal: Soft. Normal appearance and bowel sounds are normal. She exhibits no distension. There is no tenderness.   Musculoskeletal: Normal range of motion.   Neurological: She is alert and oriented to person, place, and time.   Skin: Skin is warm and dry. Capillary refill takes less than 2 seconds.        Dry scaly red plaque rash   Psychiatric: She has a normal mood and affect. Her speech is normal and behavior is normal. Thought content normal. Cognition and memory are " normal.   Vitals reviewed.        Health Maintenance       Date Due Completion Date    Hepatitis C Screening 1945 ---    Lipid Panel 1945 ---    TETANUS VACCINE 04/01/1964 ---    Mammogram 04/01/1986 ---    DEXA SCAN 04/01/1986 ---    Shingles Vaccine (1 of 2) 04/01/1996 ---    Colonoscopy 04/01/1996 ---    Pneumococcal Vaccine (65+ Low/Medium Risk) (1 of 2 - PCV13) 04/01/2011 ---    Influenza Vaccine 08/01/2019 ---               Assessment & Plan    Juana was seen today for flank pain.    Diagnoses and all orders for this visit:    Fall, initial encounter  -     X-Ray Chest PA And Lateral; Future    Rib pain on left side    Wheezing  -     predniSONE (DELTASONE) 20 MG tablet; Take 1 tablet (20 mg total) by mouth 2 (two) times daily. for 5 days    Psoriasis  -     betamethasone valerate 0.1% (VALISONE) 0.1 % Lotn; Apply topically 2 (two) times daily.    Moderate asthma, unspecified whether complicated, unspecified whether persistent  -     predniSONE (DELTASONE) 20 MG tablet; Take 1 tablet (20 mg total) by mouth 2 (two) times daily. for 5 days    - Instructed pt on need for coughing and deep breathing exercises to promote lung expansion.  - Instructed on splinting for comfort.   - We will follow up with [facility] as to why nebulizer has not been received. Pt has inhalers to use for now and will give some prednisone.   - Inst to call for any changes cough, congestion, fever, SOB.      Follow-up: Follow up if symptoms worsen or fail to improve.      Risks, benefits, and side effects were discussed with the patient. All questions were answered to the fullest satisfaction of the patient, and pt verbalized understanding and agreement to treatment plan. Pt was to call with any new or worsening symptoms, or present to the ER.

## 2019-08-05 ENCOUNTER — OFFICE VISIT (OUTPATIENT)
Dept: FAMILY MEDICINE | Facility: CLINIC | Age: 74
End: 2019-08-05
Payer: MEDICARE

## 2019-08-05 VITALS
SYSTOLIC BLOOD PRESSURE: 116 MMHG | OXYGEN SATURATION: 97 % | RESPIRATION RATE: 18 BRPM | HEIGHT: 67 IN | BODY MASS INDEX: 27.12 KG/M2 | HEART RATE: 67 BPM | DIASTOLIC BLOOD PRESSURE: 76 MMHG | WEIGHT: 172.81 LBS

## 2019-08-05 DIAGNOSIS — B07.0 PLANTAR WART OF RIGHT FOOT: Primary | ICD-10-CM

## 2019-08-05 PROCEDURE — 1101F PT FALLS ASSESS-DOCD LE1/YR: CPT | Mod: CPTII,S$GLB,, | Performed by: NURSE PRACTITIONER

## 2019-08-05 PROCEDURE — 99212 PR OFFICE/OUTPT VISIT, EST, LEVL II, 10-19 MIN: ICD-10-PCS | Mod: S$GLB,,, | Performed by: NURSE PRACTITIONER

## 2019-08-05 PROCEDURE — 99212 OFFICE O/P EST SF 10 MIN: CPT | Mod: S$GLB,,, | Performed by: NURSE PRACTITIONER

## 2019-08-05 PROCEDURE — 1101F PR PT FALLS ASSESS DOC 0-1 FALLS W/OUT INJ PAST YR: ICD-10-PCS | Mod: CPTII,S$GLB,, | Performed by: NURSE PRACTITIONER

## 2019-08-05 NOTE — PROGRESS NOTES
"Chief Complaint  Chief Complaint   Patient presents with    Toe Pain     right foot, 2nd toe     HPI:  Juana Vega is a 74 y.o. female with medical diagnoses as listed and reviewed within the medical history and problem list that presents for complaints of a plantar wart in between the right great toe and the right 2nd toe. She reports that it has been present for about 6 months and it has become more painful in the last 2 months. Pt has seen Podiatry and reports "they didn't do anything". Pt is requesting "something" to help it. She has been wearing the pads and not getting the relief that she desires. She has tried OTC wart remover.      PAST MEDICAL HISTORY:  Past Medical History:   Diagnosis Date    Anxiety     Asthma     Depression     Hyperlipidemia     Hypothyroidism        PAST SURGICAL HISTORY:  Past Surgical History:   Procedure Laterality Date    APPENDECTOMY      CHOLECYSTECTOMY      HYSTERECTOMY      TUMOR REMOVAL      Stomach       SOCIAL HISTORY:  Social History     Socioeconomic History    Marital status:      Spouse name: Not on file    Number of children: Not on file    Years of education: Not on file    Highest education level: Not on file   Occupational History    Not on file   Social Needs    Financial resource strain: Not on file    Food insecurity:     Worry: Not on file     Inability: Not on file    Transportation needs:     Medical: Not on file     Non-medical: Not on file   Tobacco Use    Smoking status: Never Smoker    Smokeless tobacco: Never Used   Substance and Sexual Activity    Alcohol use: No     Frequency: Never    Drug use: Never    Sexual activity: Not on file   Lifestyle    Physical activity:     Days per week: Not on file     Minutes per session: Not on file    Stress: Not on file   Relationships    Social connections:     Talks on phone: Not on file     Gets together: Not on file     Attends Gnosticist service: Not on file     Active member " of club or organization: Not on file     Attends meetings of clubs or organizations: Not on file     Relationship status: Not on file   Other Topics Concern    Not on file   Social History Narrative    Not on file       FAMILY HISTORY:  Family History   Problem Relation Age of Onset    Diabetes Father     Stroke Father        ALLERGIES AND MEDICATIONS: updated and reviewed.  Review of patient's allergies indicates:   Allergen Reactions    Bactrim [sulfamethoxazole-trimethoprim]     Codeine     Sulfa (sulfonamide antibiotics)     Latex, natural rubber Rash     Current Outpatient Medications   Medication Sig Dispense Refill    ADVAIR DISKUS 250-50 mcg/dose diskus inhaler Inhale 1 puff into the lungs 2 (two) times daily.  3    albuterol-ipratropium (DUO-NEB) 2.5 mg-0.5 mg/3 mL nebulizer solution       betamethasone valerate 0.1% (VALISONE) 0.1 % Lotn Apply topically 2 (two) times daily. 60 mL 2    cholecalciferol, vitamin D3, 5,000 unit capsule Take 5,000 Units by mouth.      levothyroxine (SYNTHROID, LEVOTHROID) 175 MCG tablet Take 1 tablet (175 mcg total) by mouth once daily. 30 tablet 2    montelukast (SINGULAIR) 10 mg tablet Take 10 mg by mouth every evening.      naproxen sodium (ANAPROX) 220 MG tablet Take 220 mg by mouth 2 (two) times daily with meals.      pravastatin (PRAVACHOL) 20 MG tablet Take 1 tablet (20 mg total) by mouth once daily. 30 tablet 11    salicylic acid 3 % Gel Apply 1 each topically once daily. 1 Tube 0    venlafaxine (EFFEXOR-XR) 75 MG 24 hr capsule Take 1 capsule (75 mg total) by mouth 2 (two) times daily. 180 capsule 3    VENTOLIN HFA 90 mcg/actuation inhaler TAKE 2 INHALATIONS 4 TIMES A DAY AS NEEDED FOR WHEEZING  5     No current facility-administered medications for this visit.          ROS  Review of Systems   Constitutional: Negative for appetite change, chills, fatigue and fever.   HENT: Negative for congestion, postnasal drip, rhinorrhea and sore throat.   "  Respiratory: Negative for cough, chest tightness, shortness of breath and wheezing.    Cardiovascular: Negative for chest pain and palpitations.   Gastrointestinal: Negative for abdominal distention, abdominal pain, constipation, diarrhea, nausea and vomiting.   Genitourinary: Negative for dysuria.   Musculoskeletal: Negative for myalgias.   Skin: Negative for color change and rash.        Wart to right foot between 1st and 2 nd toe.    Neurological: Negative for dizziness, weakness and headaches.   Psychiatric/Behavioral: Negative for confusion and sleep disturbance. The patient is not nervous/anxious.            PHYSICAL EXAM  Vitals:    08/05/19 1337   BP: 116/76   BP Location: Right arm   Patient Position: Sitting   Pulse: 67   Resp: 18   SpO2: 97%   Weight: 78.4 kg (172 lb 12.8 oz)   Height: 5' 7" (1.702 m)    Body mass index is 27.06 kg/m².  Weight: 78.4 kg (172 lb 12.8 oz)   Height: 5' 7" (170.2 cm)       Physical Exam   Constitutional: She is oriented to person, place, and time. She appears well-developed and well-nourished.   HENT:   Head: Normocephalic.   Eyes: Pupils are equal, round, and reactive to light.   Neck: Normal range of motion.   Cardiovascular: Normal rate, S1 normal and S2 normal.   Pulmonary/Chest: Effort normal.   Abdominal: Normal appearance.   Musculoskeletal: Normal range of motion.   Neurological: She is alert and oriented to person, place, and time.   Skin: Skin is warm and dry. Capillary refill takes less than 2 seconds.        Psychiatric: She has a normal mood and affect. Her speech is normal and behavior is normal. Thought content normal. Cognition and memory are normal.   Vitals reviewed.        Health Maintenance       Date Due Completion Date    Hepatitis C Screening 1945 ---    Lipid Panel 1945 ---    TETANUS VACCINE 04/01/1964 ---    Mammogram 04/01/1986 ---    DEXA SCAN 04/01/1986 ---    Shingles Vaccine (1 of 2) 04/01/1996 ---    Colonoscopy 04/01/1996 ---    " Pneumococcal Vaccine (65+ Low/Medium Risk) (1 of 2 - PCV13) 04/01/2011 ---    Influenza Vaccine (1) 08/01/2019 4/12/2019               Assessment & Plan    Juana was seen today for toe pain.    Diagnoses and all orders for this visit:    Plantar wart of right foot  -     salicylic acid 3 % Gel; Apply 1 each topically once daily.    - Pt did not understand that continued treatment is needed and that it may take up to 6 months for complete healing of the wart and that it could recur. I recommended a follow up with Podiatry for her.     Follow-up: No follow-ups on file.      Risks, benefits, and side effects were discussed with the patient. All questions were answered to the fullest satisfaction of the patient, and pt verbalized understanding and agreement to treatment plan. Pt was to call with any new or worsening symptoms, or present to the ER.

## 2019-08-07 ENCOUNTER — TELEPHONE (OUTPATIENT)
Dept: FAMILY MEDICINE | Facility: CLINIC | Age: 74
End: 2019-08-07

## 2019-08-07 NOTE — TELEPHONE ENCOUNTER
----- Message from Concha Yanes sent at 8/7/2019 12:11 PM CDT -----  Contact: Fátima Jang (Daughter)  Type: Needs Medical Advice    Who Called:  Fátima Jang (Daughter)  Best Call Back Number:   Additional Information: Calling to let the Nurse know that the insurance didn't cover the salicylic acid 3 % Gel and she would need something cheaper. Please advise

## 2019-08-07 NOTE — TELEPHONE ENCOUNTER
LVM can buy OTC.    Patient stated insurance did not cover medication. Is there something else she can take?  Ty

## 2019-08-22 ENCOUNTER — PATIENT OUTREACH (OUTPATIENT)
Dept: ADMINISTRATIVE | Facility: HOSPITAL | Age: 74
End: 2019-08-22

## 2019-08-22 NOTE — LETTER
"September 3, 2019    [facility]  [address] [facility] [place] [address]             [facility]  [address]  [facility] [address]  Phone: [phone]   [facility] is committed to your overall health and would like to ensure that you are up to date on your recommended test and/or procedures.   Cody Schumacher MD  has found that your chart shows you may be due for the following:     ANNUAL LABS   MAMMOGRAM   BONE MINERAL DENSITY SCAN   COLORECTAL CANCER SCREENING     If you haven't signed up for a colorectal screening please accept this invitation to be screened.       According to the American Cancer Society, colon cancer is the third most common cancer for people in the [place].  A simple screening test "Fit Kit" - done in your own home - can help find colon cancer at an e shahla stage when it can be treated, even before any signs or symptoms develop. THIS IS A YEARLY TEST.     Testing for blood in your stool (feces or bowel movement) is the first step. If you have an upcoming visit with your Primary Care Physician you can  a Fit Kit during your visit or you can  a Fit Kit at your Primary Care Clinic prior to your visit.     The Fit Kit includes:     Instructions on how to perform the test   (1) Sheet of tissue paper   (1) Small Absorption pad   (1) Bottle to hold the sample and a small probe to help you take the sample   (1) Small plastic bio-hazard bag   (1) Postage-paid return envelope     Please do your test (the instructions will tell you how) and then return your sample in the postage-paid return envelope within 24 hours of collection.     If your test results are negative, you won't need testing again for another year.  If results show you need more testing, we will call you with next steps.     Regular colorectal cancer screening is one of the most powerful weapons for preventing colon cancer.  The website https://www.cancer.org/cancer/colon-rectal-cancer.html can " "answer many of your questions about this cancer and its prevention.  Just search for "colorectal cancer"   If you have any questions please call [facility] TOUCH [phone] for assistance.     If you have had any of the above done at another facility, please let us know so that we may obtain copies from that facility.  If you have a copy of these records, please provide a copy for us to scan into your chart.  You are welcome to request that the report be faxed to us at  ([phone]).     Otherwise, please schedule these appointments at your earliest convenience by calling [phone] or going to [email or web address].     If you have an upcoming scheduled appointment for the item above, please disregard this letter.     Sincerely,   Your [facility]   MD Shweta Rinaldi L.P.N. Clinical Care Coordinator   [address] [place] [address], [place] [address] [phone]        "

## 2019-08-23 ENCOUNTER — TELEPHONE (OUTPATIENT)
Dept: FAMILY MEDICINE | Facility: CLINIC | Age: 74
End: 2019-08-23

## 2019-08-23 NOTE — TELEPHONE ENCOUNTER
Pt had wart removed on 8.5.19 and states the site is not red, swollen, inflamed and painful.   Pt has appointment with PCP on 09.06.19, but requesting orders/advice on how to care for this until then.  Please advise, thank you.        ----- Message from Rossi Philip sent at 8/23/2019  4:04 PM CDT -----  Contact: Daughter Fátima  Patient saw the doctor on 8/5 and she froze a wart off her right foot second toe.  Patient has complained since the it hurts but now it is red, looks infected and swollen.  Please call Fátima back to advise at [phone] what she can be doing til you can get her in to see the doctor.  Thanks

## 2019-09-03 NOTE — PROGRESS NOTES
"Attempted to outreach patient for pre-visit via "[employer]", no answer after a week. Sending outreach via Mail Out Letter now.    "

## 2019-09-06 ENCOUNTER — LAB VISIT (OUTPATIENT)
Dept: LAB | Facility: HOSPITAL | Age: 74
End: 2019-09-06
Attending: FAMILY MEDICINE
Payer: MEDICARE

## 2019-09-06 ENCOUNTER — OFFICE VISIT (OUTPATIENT)
Dept: FAMILY MEDICINE | Facility: CLINIC | Age: 74
End: 2019-09-06
Payer: MEDICARE

## 2019-09-06 VITALS
OXYGEN SATURATION: 97 % | WEIGHT: 174.5 LBS | BODY MASS INDEX: 27.39 KG/M2 | HEIGHT: 67 IN | DIASTOLIC BLOOD PRESSURE: 74 MMHG | RESPIRATION RATE: 18 BRPM | HEART RATE: 60 BPM | SYSTOLIC BLOOD PRESSURE: 121 MMHG

## 2019-09-06 DIAGNOSIS — E78.5 HYPERLIPIDEMIA, UNSPECIFIED HYPERLIPIDEMIA TYPE: ICD-10-CM

## 2019-09-06 DIAGNOSIS — Z78.0 ASYMPTOMATIC MENOPAUSAL STATE: ICD-10-CM

## 2019-09-06 DIAGNOSIS — Z11.59 NEED FOR HEPATITIS C SCREENING TEST: ICD-10-CM

## 2019-09-06 DIAGNOSIS — E55.9 VITAMIN D DEFICIENCY: ICD-10-CM

## 2019-09-06 DIAGNOSIS — Z13.220 LIPID SCREENING: ICD-10-CM

## 2019-09-06 DIAGNOSIS — F51.01 PRIMARY INSOMNIA: Primary | ICD-10-CM

## 2019-09-06 LAB
25(OH)D3+25(OH)D2 SERPL-MCNC: 27 NG/ML (ref 30–96)
ALBUMIN SERPL BCP-MCNC: 4.2 G/DL (ref 3.5–5.2)
ALP SERPL-CCNC: 74 U/L (ref 55–135)
ALT SERPL W/O P-5'-P-CCNC: 14 U/L (ref 10–44)
ANION GAP SERPL CALC-SCNC: 10 MMOL/L (ref 8–16)
AST SERPL-CCNC: 26 U/L (ref 10–40)
BASOPHILS # BLD AUTO: 0.09 K/UL (ref 0–0.2)
BASOPHILS NFR BLD: 1.4 % (ref 0–1.9)
BILIRUB SERPL-MCNC: 0.5 MG/DL (ref 0.1–1)
BUN SERPL-MCNC: 18 MG/DL (ref 8–23)
CALCIUM SERPL-MCNC: 9.3 MG/DL (ref 8.7–10.5)
CHLORIDE SERPL-SCNC: 104 MMOL/L (ref 95–110)
CHOLEST SERPL-MCNC: 298 MG/DL (ref 120–199)
CHOLEST/HDLC SERPL: 4.4 {RATIO} (ref 2–5)
CO2 SERPL-SCNC: 25 MMOL/L (ref 23–29)
CREAT SERPL-MCNC: 0.9 MG/DL (ref 0.5–1.4)
DIFFERENTIAL METHOD: ABNORMAL
EOSINOPHIL # BLD AUTO: 0.4 K/UL (ref 0–0.5)
EOSINOPHIL NFR BLD: 7 % (ref 0–8)
ERYTHROCYTE [DISTWIDTH] IN BLOOD BY AUTOMATED COUNT: 13.3 % (ref 11.5–14.5)
EST. GFR  (AFRICAN AMERICAN): >60 ML/MIN/1.73 M^2
EST. GFR  (NON AFRICAN AMERICAN): >60 ML/MIN/1.73 M^2
GLUCOSE SERPL-MCNC: 93 MG/DL (ref 70–110)
HCT VFR BLD AUTO: 46.9 % (ref 37–48.5)
HDLC SERPL-MCNC: 68 MG/DL (ref 40–75)
HDLC SERPL: 22.8 % (ref 20–50)
HGB BLD-MCNC: 14.7 G/DL (ref 12–16)
IMM GRANULOCYTES # BLD AUTO: 0.02 K/UL (ref 0–0.04)
IMM GRANULOCYTES NFR BLD AUTO: 0.3 % (ref 0–0.5)
LDLC SERPL CALC-MCNC: 210 MG/DL (ref 63–159)
LYMPHOCYTES # BLD AUTO: 1.9 K/UL (ref 1–4.8)
LYMPHOCYTES NFR BLD: 29.4 % (ref 18–48)
MCH RBC QN AUTO: 28.7 PG (ref 27–31)
MCHC RBC AUTO-ENTMCNC: 31.3 G/DL (ref 32–36)
MCV RBC AUTO: 92 FL (ref 82–98)
MONOCYTES # BLD AUTO: 0.5 K/UL (ref 0.3–1)
MONOCYTES NFR BLD: 7.1 % (ref 4–15)
NEUTROPHILS # BLD AUTO: 3.5 K/UL (ref 1.8–7.7)
NEUTROPHILS NFR BLD: 54.8 % (ref 38–73)
NONHDLC SERPL-MCNC: 230 MG/DL
NRBC BLD-RTO: 0 /100 WBC
PLATELET # BLD AUTO: 350 K/UL (ref 150–350)
PMV BLD AUTO: 10.2 FL (ref 9.2–12.9)
POTASSIUM SERPL-SCNC: 4.3 MMOL/L (ref 3.5–5.1)
PROT SERPL-MCNC: 8 G/DL (ref 6–8.4)
RBC # BLD AUTO: 5.12 M/UL (ref 4–5.4)
SODIUM SERPL-SCNC: 139 MMOL/L (ref 136–145)
T4 FREE SERPL-MCNC: 0.8 NG/DL (ref 0.71–1.51)
TRIGL SERPL-MCNC: 100 MG/DL (ref 30–150)
TSH SERPL DL<=0.005 MIU/L-ACNC: 43.51 UIU/ML (ref 0.34–5.6)
WBC # BLD AUTO: 6.33 K/UL (ref 3.9–12.7)

## 2019-09-06 PROCEDURE — 99999 PR PBB SHADOW E&M-EST. PATIENT-LVL III: CPT | Mod: PBBFAC,,, | Performed by: FAMILY MEDICINE

## 2019-09-06 PROCEDURE — 84439 ASSAY OF FREE THYROXINE: CPT

## 2019-09-06 PROCEDURE — 1101F PR PT FALLS ASSESS DOC 0-1 FALLS W/OUT INJ PAST YR: ICD-10-PCS | Mod: CPTII,S$GLB,, | Performed by: FAMILY MEDICINE

## 2019-09-06 PROCEDURE — 1101F PT FALLS ASSESS-DOCD LE1/YR: CPT | Mod: CPTII,S$GLB,, | Performed by: FAMILY MEDICINE

## 2019-09-06 PROCEDURE — 86803 HEPATITIS C AB TEST: CPT

## 2019-09-06 PROCEDURE — 84443 ASSAY THYROID STIM HORMONE: CPT

## 2019-09-06 PROCEDURE — 85025 COMPLETE CBC W/AUTO DIFF WBC: CPT

## 2019-09-06 PROCEDURE — 99214 OFFICE O/P EST MOD 30 MIN: CPT | Mod: S$GLB,,, | Performed by: FAMILY MEDICINE

## 2019-09-06 PROCEDURE — 36415 COLL VENOUS BLD VENIPUNCTURE: CPT

## 2019-09-06 PROCEDURE — 80053 COMPREHEN METABOLIC PANEL: CPT

## 2019-09-06 PROCEDURE — 80061 LIPID PANEL: CPT

## 2019-09-06 PROCEDURE — 82306 VITAMIN D 25 HYDROXY: CPT

## 2019-09-06 PROCEDURE — 99999 PR PBB SHADOW E&M-EST. PATIENT-LVL III: ICD-10-PCS | Mod: PBBFAC,,, | Performed by: FAMILY MEDICINE

## 2019-09-06 PROCEDURE — 99214 PR OFFICE/OUTPT VISIT, EST, LEVL IV, 30-39 MIN: ICD-10-PCS | Mod: S$GLB,,, | Performed by: FAMILY MEDICINE

## 2019-09-06 RX ORDER — TRAZODONE HYDROCHLORIDE 50 MG/1
TABLET ORAL
Qty: 60 TABLET | Refills: 2 | Status: SHIPPED | OUTPATIENT
Start: 2019-09-06 | End: 2019-10-02

## 2019-09-06 NOTE — PROGRESS NOTES
Subjective:       Patient ID: Juana Vega is a 74 y.o. female.    Chief Complaint: Memory Loss (Mammo, Dexa, & Colonoscopy @ [facility]); Insomnia; and Follow-up    Follow up    Insomnia  Worsening  Associated with difficulty falling aslepp    In regards to the patient's dyslipidemia, patient reports has been compliant with the medication regimen  without side effects.      Review of Systems   Constitutional: Negative for activity change, appetite change, chills, fatigue and fever.   HENT: Negative for congestion, dental problem, facial swelling, nosebleeds, postnasal drip, rhinorrhea, sinus pain, sore throat, trouble swallowing and voice change.    Eyes: Negative for pain, discharge and visual disturbance.   Respiratory: Negative for apnea, cough, chest tightness, shortness of breath and wheezing.    Cardiovascular: Negative for chest pain and palpitations.   Gastrointestinal: Negative for abdominal distention, abdominal pain, blood in stool, constipation, diarrhea, nausea and vomiting.   Endocrine: Negative for cold intolerance, polydipsia and polyuria.   Genitourinary: Negative for difficulty urinating, dysuria, enuresis and flank pain.   Musculoskeletal: Negative for arthralgias, back pain and myalgias.   Skin: Negative for color change and rash.        Wart to right foot between 1st and 2 nd toe.    Allergic/Immunologic: Negative for environmental allergies and immunocompromised state.   Neurological: Negative for dizziness, weakness, light-headedness and headaches.   Hematological: Negative for adenopathy.   Psychiatric/Behavioral: Negative for agitation, behavioral problems, confusion, decreased concentration, dysphoric mood and sleep disturbance. The patient is not nervous/anxious.    All other systems reviewed and are negative.        Reviewed family, medical, surgical, and social history.    Objective:      /74 (BP Location: Left arm, Patient Position: Sitting, BP Method: Medium (Automatic))    "Pulse 60   Resp 18   Ht 5' 7" (1.702 m)   Wt 79.2 kg (174 lb 8 oz)   SpO2 97%   BMI 27.33 kg/m²   Physical Exam   Constitutional: She is oriented to person, place, and time. She appears well-developed and well-nourished. No distress.   HENT:   Head: Normocephalic and atraumatic.   Nose: Nose normal.   Mouth/Throat: Oropharynx is clear and moist. No oropharyngeal exudate.   Eyes: Pupils are equal, round, and reactive to light. Conjunctivae and EOM are normal. No scleral icterus.   Neck: Normal range of motion. Neck supple. No thyromegaly present.   Cardiovascular: Normal rate, regular rhythm and normal heart sounds. Exam reveals no gallop and no friction rub.   No murmur heard.  Pulmonary/Chest: Effort normal and breath sounds normal. No respiratory distress. She has no wheezes. She has no rales. She exhibits no tenderness.   Abdominal: Soft. Bowel sounds are normal. She exhibits no distension. There is no tenderness. There is no guarding.   Musculoskeletal: Normal range of motion. She exhibits no edema, tenderness or deformity.   Lymphadenopathy:     She has no cervical adenopathy.   Neurological: She is alert and oriented to person, place, and time. She displays normal reflexes. No cranial nerve deficit or sensory deficit. She exhibits normal muscle tone.   Skin: Skin is warm and dry. No rash noted. She is not diaphoretic. No erythema. No pallor.   Psychiatric: She has a normal mood and affect. Her behavior is normal. Judgment and thought content normal.   Nursing note and vitals reviewed.      Assessment:       1. Primary insomnia    2. Asymptomatic menopausal state    3. Lipid screening    4. Hyperlipidemia, unspecified hyperlipidemia type    5. Vitamin D deficiency        Plan:       Primary insomnia  -     traZODone (DESYREL) 50 MG tablet; Take 1-2 tablets at night as needed for insomnia  Dispense: 60 tablet; Refill: 2    Asymptomatic menopausal state    Lipid screening  -     Comprehensive metabolic " panel; Future; Expected date: 09/06/2019  -     CBC auto differential; Future; Expected date: 09/06/2019  -     Lipid panel; Future; Expected date: 09/06/2019  -     TSH; Future; Expected date: 09/06/2019  -     T4, free; Future; Expected date: 09/06/2019  -     Vitamin D; Future; Expected date: 09/06/2019    Hyperlipidemia, unspecified hyperlipidemia type  -     Comprehensive metabolic panel; Future; Expected date: 09/06/2019  -     CBC auto differential; Future; Expected date: 09/06/2019  -     Lipid panel; Future; Expected date: 09/06/2019  -     TSH; Future; Expected date: 09/06/2019  -     T4, free; Future; Expected date: 09/06/2019  -     Vitamin D; Future; Expected date: 09/06/2019    Vitamin D deficiency  -     Comprehensive metabolic panel; Future; Expected date: 09/06/2019  -     CBC auto differential; Future; Expected date: 09/06/2019  -     Lipid panel; Future; Expected date: 09/06/2019  -     TSH; Future; Expected date: 09/06/2019  -     T4, free; Future; Expected date: 09/06/2019  -     Vitamin D; Future; Expected date: 09/06/2019            Risks, benefits, and side effects were discussed with the patient. All questions were answered to the fullest satisfaction of the patient, and pt verbalized understanding and agreement to treatment plan. Pt was to call with any new or worsening symptoms, or present to the ER.

## 2019-09-09 LAB — HCV AB SERPL QL IA: NEGATIVE

## 2019-09-10 ENCOUNTER — TELEPHONE (OUTPATIENT)
Dept: FAMILY MEDICINE | Facility: CLINIC | Age: 74
End: 2019-09-10

## 2019-09-10 NOTE — TELEPHONE ENCOUNTER
----- Message from Cody Schumacher MD sent at 9/10/2019  9:26 AM CDT -----  Vitamin d was low, cholesterol was high, and thyroid function was low. Is she taking her thyroid replacement meds?

## 2019-09-12 ENCOUNTER — PATIENT OUTREACH (OUTPATIENT)
Dept: ADMINISTRATIVE | Facility: HOSPITAL | Age: 74
End: 2019-09-12

## 2019-09-18 ENCOUNTER — TELEPHONE (OUTPATIENT)
Dept: FAMILY MEDICINE | Facility: CLINIC | Age: 74
End: 2019-09-18

## 2019-09-18 NOTE — TELEPHONE ENCOUNTER
Pt daughter stated that pt insurance saw that pt was prescribed Anoro and wanted to know why it was prescribed. After looking over pt MAR, she does not have that particular medication listed. Pt daughter stated that it may be listed from when pt was being seen within [facility]. Pt daughter will contact [facility] for the requested information.

## 2019-09-18 NOTE — TELEPHONE ENCOUNTER
----- Message from Olga Borjas sent at 9/18/2019  3:42 PM CDT -----  Type: Needs Medical Advice    Who Called:  Fátima Jang (Daughter)  Symptoms (please be specific):  Requesting to discuss medication Anora   How long has patient had these symptoms:  Needs to know what it is for ?   Pharmacy name and phone #:     Best Call Back Number: .[phone] (home)     Additional Information: for insurance

## 2019-10-02 ENCOUNTER — HOSPITAL ENCOUNTER (OUTPATIENT)
Dept: RADIOLOGY | Facility: HOSPITAL | Age: 74
Discharge: HOME | End: 2019-10-02
Attending: FAMILY MEDICINE
Payer: MEDICARE

## 2019-10-02 ENCOUNTER — OFFICE VISIT (OUTPATIENT)
Dept: FAMILY MEDICINE | Facility: CLINIC | Age: 74
End: 2019-10-02
Payer: MEDICARE

## 2019-10-02 VITALS
SYSTOLIC BLOOD PRESSURE: 117 MMHG | HEIGHT: 67 IN | OXYGEN SATURATION: 96 % | RESPIRATION RATE: 18 BRPM | DIASTOLIC BLOOD PRESSURE: 75 MMHG | BODY MASS INDEX: 27.81 KG/M2 | WEIGHT: 177.19 LBS | HEART RATE: 75 BPM

## 2019-10-02 DIAGNOSIS — M79.89 TOE SWELLING: ICD-10-CM

## 2019-10-02 DIAGNOSIS — M79.89 SWELLING OF LEFT FOOT: ICD-10-CM

## 2019-10-02 DIAGNOSIS — M79.89 SWELLING OF LEFT FOOT: Primary | ICD-10-CM

## 2019-10-02 PROCEDURE — 73610 X-RAY EXAM OF ANKLE: CPT | Mod: TC,FY,LT

## 2019-10-02 PROCEDURE — 99214 OFFICE O/P EST MOD 30 MIN: CPT | Mod: S$GLB,,, | Performed by: FAMILY MEDICINE

## 2019-10-02 PROCEDURE — 73660 XR TOE 2 OR MORE VIEWS RIGHT: ICD-10-PCS | Mod: 26,RT,, | Performed by: RADIOLOGY

## 2019-10-02 PROCEDURE — 73660 X-RAY EXAM OF TOE(S): CPT | Mod: 26,RT,, | Performed by: RADIOLOGY

## 2019-10-02 PROCEDURE — 73660 X-RAY EXAM OF TOE(S): CPT | Mod: TC,FY,RT

## 2019-10-02 PROCEDURE — 73610 XR ANKLE COMPLETE 3 VIEW LEFT: ICD-10-PCS | Mod: 26,LT,, | Performed by: RADIOLOGY

## 2019-10-02 PROCEDURE — 1101F PT FALLS ASSESS-DOCD LE1/YR: CPT | Mod: CPTII,S$GLB,, | Performed by: FAMILY MEDICINE

## 2019-10-02 PROCEDURE — 73590 X-RAY EXAM OF LOWER LEG: CPT | Mod: 26,LT,, | Performed by: RADIOLOGY

## 2019-10-02 PROCEDURE — 99214 PR OFFICE/OUTPT VISIT, EST, LEVL IV, 30-39 MIN: ICD-10-PCS | Mod: S$GLB,,, | Performed by: FAMILY MEDICINE

## 2019-10-02 PROCEDURE — 73610 X-RAY EXAM OF ANKLE: CPT | Mod: 26,LT,, | Performed by: RADIOLOGY

## 2019-10-02 PROCEDURE — 73590 XR TIBIA FIBULA 2 VIEW LEFT: ICD-10-PCS | Mod: 26,LT,, | Performed by: RADIOLOGY

## 2019-10-02 PROCEDURE — 1101F PR PT FALLS ASSESS DOC 0-1 FALLS W/OUT INJ PAST YR: ICD-10-PCS | Mod: CPTII,S$GLB,, | Performed by: FAMILY MEDICINE

## 2019-10-02 PROCEDURE — 73590 X-RAY EXAM OF LOWER LEG: CPT | Mod: TC,FY,LT

## 2019-10-02 NOTE — PROGRESS NOTES
[facility] - Clinic Note    Subjective      Ms. Vega is a 74 y.o. female who presents to clinic for acute care visit.    Patient has had left ankle pain for the last 1 week.  Has had associated swelling but no redness or skin changes.  Noticed decreased range of motion.  No injury.  Has tried over-the-counter medication with minimal to no relief.  Has never had anything like this before.  Having difficulty bearing weight on the foot.  Right 2nd toe discoloration - for the last 1 month patient has noticed a discoloration in the right 2nd toe.  Was treated for a plantar wart which is not improved the appearance of the toe.  It has gone from being very painful to being numb.  No injury to that foot.  No history of CAD.  Has known hyperlipidemia that is severe.    PMH Jauna has a past medical history of Anxiety, Asthma, Depression, Hyperlipidemia, and Hypothyroidism.   PSXH Juana has a past surgical history that includes Appendectomy; Cholecystectomy; Tumor removal; and Hysterectomy.    Juana's family history includes Diabetes in her father; Stroke in her father.   VALERIANO Arias reports that she has never smoked. She has never used smokeless tobacco. She reports that she does not drink alcohol or use drugs.   MIRZA Arias is allergic to bactrim [sulfamethoxazole-trimethoprim]; codeine; sulfa (sulfonamide antibiotics); and latex, natural rubber.   ELIE Arias has a current medication list which includes the following prescription(s): advair diskus, albuterol-ipratropium, cholecalciferol (vitamin d3), levothyroxine, montelukast, naproxen sodium, pravastatin, and venlafaxine.     Review of Systems   Constitutional: Negative for activity change and fatigue.   Musculoskeletal: Positive for arthralgias (Left ankle and right foot pain).   Skin: Positive for color change ( right 2nd toe discoloration).   Neurological: Positive for numbness ( right 2nd toe).     Objective     /75 (BP Location: Right arm,  "Patient Position: Sitting)   Pulse 75   Resp 18   Ht 5' 7" (1.702 m)   Wt 80.4 kg (177 lb 3.2 oz)   SpO2 96%   BMI 27.75 kg/m²     Physical Exam   Constitutional: She is well-developed, well-nourished, and in no distress. No distress.   HENT:   Head: Normocephalic and atraumatic.   Eyes: Conjunctivae are normal.   Cardiovascular: Normal rate, regular rhythm, normal heart sounds and intact distal pulses.   No murmur heard.  Pulmonary/Chest: Effort normal and breath sounds normal. She has no wheezes. She has no rales.   Musculoskeletal:   Pain with range of motion of the left foot especially with plantar flexion and eversion.  Though inversion and dorsiflexion also painful.  Tenderness to palpation proximal to the medial malleolus.  No tenderness on the lateral or medial malleolus.  No obvious deformity.  No swelling noted   Neurological: She is alert.   Skin:   Right 2nd toe with bluish discoloration.  No extending erythema.  Loss of sensation on the toe.   Vitals reviewed.    Assessment/Plan     1. Swelling of left foot  X-Ray Ankle Complete Left    X-Ray Tibia Fibula 2 View Left   2. Toe swelling  X-Ray Toe 2 or More Views Right     Will obtain x-rays and follow-up with daughter and patient.  Consider referral to vascular surgery.  Optimize chronic conditions especially hypertension and dyslipidemia.      Precautions given  Needs close follow-up    Future Appointments   Date Time Provider Department Center   3/6/2020 11:00 AM Cody Schumacher MD [facility]       Rafaela Domínugez MD  Family Medicine  [facility]  [phone]      "

## 2019-10-04 ENCOUNTER — TELEPHONE (OUTPATIENT)
Dept: FAMILY MEDICINE | Facility: CLINIC | Age: 74
End: 2019-10-04

## 2019-10-04 NOTE — TELEPHONE ENCOUNTER
----- Message from Steffi Lopez sent at 10/4/2019  1:07 PM CDT -----  Contact: Fátima Jang (Daughter)  Type:  Test Results    Who Called:  Fátima Jang (Daughter)  Name of Test (Lab/Mammo/Etc):  xray  Date of Test:  10/02/2019  Ordering Provider:  Sang  Where the test was performed:  [facility]  Best Call Back Number:  [phone]  Additional Information:

## 2019-10-08 ENCOUNTER — TELEPHONE (OUTPATIENT)
Dept: FAMILY MEDICINE | Facility: CLINIC | Age: 74
End: 2019-10-08

## 2019-10-08 NOTE — TELEPHONE ENCOUNTER
Pts daughter states she is still having severe ankle pain and it is warm to the touch. Has been taking Naproxen with no relief. Would like to schedule an appointment to get her ankle re-evaluated. Schedule dpt for 10/9 at 9:20am. Pts daughter verbalized understanding.

## 2019-10-08 NOTE — TELEPHONE ENCOUNTER
----- Message from Olga Borjas sent at 10/8/2019 10:24 AM CDT -----  Contact: Fátima Jang (Daughter)  Type:  Test Results     Who Called:  Fátima Suhail (Daughter)  Name of Test (Lab/Mammo/Etc):  Had an x-ray    Date of Test:  10/02/2019  Ordering Provider:  Sang  Where the test was performed:  [facility]  Best Call Back Number:  [phone]  Additional Information: her ankle is still swollen .. Not worse / but states no better ? Asking to speak with Nurse

## 2019-10-09 ENCOUNTER — OFFICE VISIT (OUTPATIENT)
Dept: FAMILY MEDICINE | Facility: CLINIC | Age: 74
End: 2019-10-09
Payer: MEDICARE

## 2019-10-09 VITALS
HEART RATE: 70 BPM | TEMPERATURE: 98 F | SYSTOLIC BLOOD PRESSURE: 112 MMHG | DIASTOLIC BLOOD PRESSURE: 71 MMHG | RESPIRATION RATE: 16 BRPM | HEIGHT: 67 IN | BODY MASS INDEX: 28.09 KG/M2 | OXYGEN SATURATION: 97 % | WEIGHT: 179 LBS

## 2019-10-09 DIAGNOSIS — R20.0 NUMBNESS OF TOES: ICD-10-CM

## 2019-10-09 DIAGNOSIS — Z23 FLU VACCINE NEED: Primary | ICD-10-CM

## 2019-10-09 DIAGNOSIS — M25.572 ACUTE LEFT ANKLE PAIN: ICD-10-CM

## 2019-10-09 PROCEDURE — 99214 OFFICE O/P EST MOD 30 MIN: CPT | Mod: S$GLB,,, | Performed by: FAMILY MEDICINE

## 2019-10-09 PROCEDURE — 1101F PR PT FALLS ASSESS DOC 0-1 FALLS W/OUT INJ PAST YR: ICD-10-PCS | Mod: CPTII,S$GLB,, | Performed by: FAMILY MEDICINE

## 2019-10-09 PROCEDURE — 99214 PR OFFICE/OUTPT VISIT, EST, LEVL IV, 30-39 MIN: ICD-10-PCS | Mod: S$GLB,,, | Performed by: FAMILY MEDICINE

## 2019-10-09 PROCEDURE — 1101F PT FALLS ASSESS-DOCD LE1/YR: CPT | Mod: CPTII,S$GLB,, | Performed by: FAMILY MEDICINE

## 2019-10-09 NOTE — PROGRESS NOTES
"  [facility] - Clinic Note    Subjective      Ms. Vega is a 74 y.o. female who presents to clinic for follow up of ankle and toe.     Still having left ankle pain though improved. Using tylenol. Not able to walk on it as much as before. Feels unstable. No skin changes.  Toe has improved on the right foot though still numb.  Reviewed imaging with patient.     PM Juana has a past medical history of Anxiety, Asthma, Depression, Hyperlipidemia, and Hypothyroidism.   PSXH Juana has a past surgical history that includes Appendectomy; Cholecystectomy; Tumor removal; and Hysterectomy.    Juana's family history includes Diabetes in her father; Stroke in her father.    Juana reports that she has never smoked. She has never used smokeless tobacco. She reports that she does not drink alcohol or use drugs.   MIRZA Arias is allergic to bactrim [sulfamethoxazole-trimethoprim]; codeine; sulfa (sulfonamide antibiotics); and latex, natural rubber.   MED Juana has a current medication list which includes the following prescription(s): advair diskus, albuterol-ipratropium, cholecalciferol (vitamin d3), levothyroxine, montelukast, naproxen sodium, pravastatin, venlafaxine, and leg brace.     Review of Systems   Constitutional: Negative for fever.   Respiratory: Negative for shortness of breath.    Cardiovascular: Negative for chest pain.   Gastrointestinal: Negative for nausea and vomiting.   Musculoskeletal: Positive for arthralgias (left ankle pain).   Neurological: Positive for numbness (right 2nd toe).     Objective     /71 (BP Location: Left arm, Patient Position: Sitting, BP Method: Medium (Automatic))   Pulse 70   Temp 97.5 °F (36.4 °C) (Oral)   Resp 16   Ht 5' 7" (1.702 m)   Wt 81.2 kg (179 lb)   SpO2 97%   BMI 28.04 kg/m²     Physical Exam   Constitutional: She is well-developed, well-nourished, and in no distress. No distress.   Cardiovascular: Normal rate, regular rhythm and normal heart " sounds.   Pulmonary/Chest: Effort normal and breath sounds normal. She has no wheezes. She has no rales.   Abdominal: Soft.   Musculoskeletal:   Left ankle pain, worse on medial malleolus, no swelling or erythema, no skin changes   Neurological: She is alert.   Numbness noted on right second toe   Skin: Skin is warm and dry. No rash noted. No erythema.   Vitals reviewed.    Assessment/Plan     1. Flu vaccine need  influenza (FLUZONE HIGH-DOSE) vaccine 0.5 mL   2. Acute left ankle pain     3. Numbness of toes         Lace up ankle brace ordered for left ankle. Ultrasound and PT if pain persists.  Optimize cardiac risk factors.   Flu shot today      No follow-ups on file.    Future Appointments   Date Time Provider Department Center   3/6/2020 11:00 AM Cody Schumacher MD [facility]       Rafaela Domínguez MD  Family Medicine  [facility]  [phone]      The 10-year ASCVD risk score (Maritza COON Jr., et al., 2013) is: 10.8%    Values used to calculate the score:      Age: 74 years      Sex: Female      Is Non- : No      Diabetic: No      Tobacco smoker: No      Systolic Blood Pressure: 112 mmHg      Is BP treated: No      HDL Cholesterol: 68 mg/dL      Total Cholesterol: 298 mg/dL

## 2019-11-15 ENCOUNTER — PATIENT OUTREACH (OUTPATIENT)
Dept: ADMINISTRATIVE | Facility: HOSPITAL | Age: 74
End: 2019-11-15

## 2019-12-11 ENCOUNTER — TELEPHONE (OUTPATIENT)
Dept: FAMILY MEDICINE | Facility: CLINIC | Age: 74
End: 2019-12-11

## 2019-12-11 NOTE — TELEPHONE ENCOUNTER
Verified dose for medication to be sent to patient by pharmacy.     ----- Message from Leonila Tran sent at 12/11/2019  2:36 PM CST -----  Contact: [facility]  Type:  Pharmacy Calling to Clarify an RX    Name of Caller:  Jean Pierre  [facility]armacy Name:  [facility]  Prescription Name:  venlafaxine (EFFEXOR-XR) 75 MG 24 hr capsule  What do they need to clarify?:  dosage  Best Call Back Number:  , option 3  Additional Information:  Calling to clarify that the dosage of this medication is correct since she has not had it filled since march.

## 2020-01-06 DIAGNOSIS — F32.89 OTHER DEPRESSION: ICD-10-CM

## 2020-01-06 RX ORDER — VENLAFAXINE HYDROCHLORIDE 75 MG/1
75 CAPSULE, EXTENDED RELEASE ORAL 2 TIMES DAILY
Qty: 180 CAPSULE | Refills: 3 | Status: SHIPPED | OUTPATIENT
Start: 2020-01-06

## 2020-02-20 ENCOUNTER — PATIENT OUTREACH (OUTPATIENT)
Dept: ADMINISTRATIVE | Facility: HOSPITAL | Age: 75
End: 2020-02-20

## 2020-05-20 ENCOUNTER — PATIENT MESSAGE (OUTPATIENT)
Dept: ADMINISTRATIVE | Facility: HOSPITAL | Age: 75
End: 2020-05-20

## 2020-09-17 ENCOUNTER — PATIENT MESSAGE (OUTPATIENT)
Dept: ADMINISTRATIVE | Facility: HOSPITAL | Age: 75
End: 2020-09-17

## 2020-09-25 DIAGNOSIS — Z12.31 OTHER SCREENING MAMMOGRAM: ICD-10-CM

## 2020-09-29 ENCOUNTER — PATIENT MESSAGE (OUTPATIENT)
Dept: ADMINISTRATIVE | Facility: HOSPITAL | Age: 75
End: 2020-09-29

## 2020-10-05 ENCOUNTER — PATIENT MESSAGE (OUTPATIENT)
Dept: ADMINISTRATIVE | Facility: HOSPITAL | Age: 75
End: 2020-10-05

## 2021-01-04 ENCOUNTER — PATIENT MESSAGE (OUTPATIENT)
Dept: ADMINISTRATIVE | Facility: HOSPITAL | Age: 76
End: 2021-01-04

## 2021-04-07 ENCOUNTER — PATIENT MESSAGE (OUTPATIENT)
Dept: ADMINISTRATIVE | Facility: HOSPITAL | Age: 76
End: 2021-04-07

## 2022-01-11 ENCOUNTER — PATIENT MESSAGE (OUTPATIENT)
Dept: ADMINISTRATIVE | Facility: HOSPITAL | Age: 77
End: 2022-01-11
Payer: MEDICARE

## 2022-05-31 ENCOUNTER — PATIENT MESSAGE (OUTPATIENT)
Dept: ADMINISTRATIVE | Facility: HOSPITAL | Age: 77
End: 2022-05-31
Payer: MEDICARE